# Patient Record
Sex: FEMALE | Race: OTHER | NOT HISPANIC OR LATINO | Employment: FULL TIME | URBAN - METROPOLITAN AREA
[De-identification: names, ages, dates, MRNs, and addresses within clinical notes are randomized per-mention and may not be internally consistent; named-entity substitution may affect disease eponyms.]

---

## 2024-01-09 ENCOUNTER — OFFICE VISIT (OUTPATIENT)
Dept: PRIMARY CARE | Facility: CLINIC | Age: 39
End: 2024-01-09
Payer: COMMERCIAL

## 2024-01-09 VITALS
BODY MASS INDEX: 25.43 KG/M2 | RESPIRATION RATE: 18 BRPM | DIASTOLIC BLOOD PRESSURE: 84 MMHG | WEIGHT: 158.2 LBS | HEART RATE: 109 BPM | TEMPERATURE: 97.5 F | OXYGEN SATURATION: 98 % | HEIGHT: 66 IN | SYSTOLIC BLOOD PRESSURE: 130 MMHG

## 2024-01-09 DIAGNOSIS — J45.40 MODERATE PERSISTENT ASTHMA WITHOUT COMPLICATION (HHS-HCC): Primary | ICD-10-CM

## 2024-01-09 DIAGNOSIS — M79.89 LEG SWELLING: ICD-10-CM

## 2024-01-09 PROCEDURE — 1036F TOBACCO NON-USER: CPT

## 2024-01-09 PROCEDURE — 99213 OFFICE O/P EST LOW 20 MIN: CPT

## 2024-01-09 RX ORDER — ALBUTEROL SULFATE 90 UG/1
2 AEROSOL, METERED RESPIRATORY (INHALATION) EVERY 4 HOURS PRN
Qty: 6.7 G | Refills: 11 | Status: SHIPPED | OUTPATIENT
Start: 2024-01-09 | End: 2025-01-08

## 2024-01-09 RX ORDER — BUDESONIDE AND FORMOTEROL FUMARATE DIHYDRATE 80; 4.5 UG/1; UG/1
2 AEROSOL RESPIRATORY (INHALATION)
Qty: 10.2 G | Refills: 0 | Status: SHIPPED | OUTPATIENT
Start: 2024-01-09

## 2024-01-09 ASSESSMENT — ENCOUNTER SYMPTOMS
EYE ITCHING: 0
ABDOMINAL PAIN: 0
DIZZINESS: 0
UNEXPECTED WEIGHT CHANGE: 0
CONFUSION: 0
FEVER: 0
DIARRHEA: 0
LIGHT-HEADEDNESS: 0
DYSURIA: 0
FREQUENCY: 0
SLEEP DISTURBANCE: 0
RHINORRHEA: 0
HEMATURIA: 0
EYE DISCHARGE: 0
NAUSEA: 0
APPETITE CHANGE: 0
VOMITING: 0
SHORTNESS OF BREATH: 1
WHEEZING: 1
HEADACHES: 0
COUGH: 0
JOINT SWELLING: 0
MYALGIAS: 0
WOUND: 0
CHILLS: 0
CHEST TIGHTNESS: 0
PALPITATIONS: 0
CONSTIPATION: 0

## 2024-01-09 ASSESSMENT — PAIN SCALES - GENERAL: PAINLEVEL: 0-NO PAIN

## 2024-01-09 NOTE — PATIENT INSTRUCTIONS
It was so great to see you today Linda Harris  . Today we discussed:    -Ordered Symbicort to take 2 puffs, twice daily  -refilled albuterol inhaler as needed for shortness of breath or wheezing  -schedule pulmonary function test to check severity of lungs  -Follow up with Dr. Ivy Gonzalez or Fred Harmon  -Ordered compression socks for swelling. Raise leg    Call [phone]  For Care if you need to schedule appointment with specialist or images.    Follow up in       You were see by:    Dr. Melina Simmons D.O.  [facility]   Phone: [phone]

## 2024-01-09 NOTE — LETTER
January 9, 2024     Patient: Linda Harris   YOB: 1985   Date of Visit: 1/9/2024       To Whom It May Concern:    Linda Harris was seen in my clinic on 1/9/2024 at 10:00 am. Please excuse Linda for her absence from work on this day to make the appointment.    If you have any questions or concerns, please don't hesitate to call.         Sincerely,         Melina Simmons,         CC: No Recipients

## 2024-01-09 NOTE — PROGRESS NOTES
"Subjective   Patient ID: Linda Harris is a 39 y.o. female who presents for Establish Care (npv), Med Refill, and Leg Swelling (3-4 months leg swelling. Sometimes it goes downs then swells on and off).    HPI   Patient speaks Upper sorbian and utilized [employer]  [id number]. Patient presents to clinic with the follow complaints.     #Asthma   -complains of shortness of breath with exertion and no symptoms at reset. Reports she hears breathing noises from \"neck\" and points to her throat.  Associated symptoms include coughing at night. States she only has albuterol inhaler at home and has been using it daily. Denies rhinorrhea, fever, chills, or orthopnea. No PFTs on file.      #Leg swelling  - started in the last 3-4 months. Swelling is intermittent and mostly in  BL ankles. Reports prolonged sitting with current job. Denies calf tenderness, pain with ambulation, numbness, or tingling.       PMH: moderate persistent asthma  PSH:  x3, tubal ligation  Allergies: NKDA  SH: denies tobacco use      Review of Systems   Constitutional:  Negative for appetite change, chills, fever and unexpected weight change.   HENT:  Negative for congestion, ear discharge, rhinorrhea and sneezing.    Eyes:  Negative for discharge, itching and visual disturbance.   Respiratory:  Positive for shortness of breath and wheezing. Negative for cough and chest tightness.    Cardiovascular:  Negative for chest pain, palpitations and leg swelling.   Gastrointestinal:  Negative for abdominal pain, constipation, diarrhea, nausea and vomiting.   Endocrine: Negative for cold intolerance and heat intolerance.   Genitourinary:  Negative for dysuria, frequency, hematuria and urgency.   Musculoskeletal:  Negative for joint swelling and myalgias.   Skin:  Negative for rash and wound.   Neurological:  Negative for dizziness, light-headedness and headaches.   Psychiatric/Behavioral:  Negative for confusion, sleep disturbance and suicidal ideas.  " "      Objective   /84 (BP Location: Left arm, Patient Position: Sitting, BP Cuff Size: Adult)   Pulse 109   Temp 36.4 °C (97.5 °F) (Temporal)   Resp 18   Ht 1.676 m (5' 6\")   Wt 71.8 kg (158 lb 3.2 oz)   SpO2 98%   BMI 25.53 kg/m²     Physical Exam  Constitutional:       General: She is not in acute distress.     Appearance: Normal appearance. She is obese. She is not ill-appearing.   HENT:      Head: Normocephalic and atraumatic.      Nose: Nose normal.   Eyes:      Conjunctiva/sclera: Conjunctivae normal.   Cardiovascular:      Rate and Rhythm: Regular rhythm. Tachycardia present.      Heart sounds: No murmur heard.  Pulmonary:      Effort: Pulmonary effort is normal. No respiratory distress.      Breath sounds: Wheezing (mild inspiratory and expiratory wheezing) present. No rhonchi.   Abdominal:      General: There is no distension.      Palpations: Abdomen is soft.      Tenderness: There is no abdominal tenderness. There is no guarding.      Hernia: No hernia is present.      Comments: Obese abdomen   Musculoskeletal:      Right lower leg: No edema.      Left lower leg: No edema.   Skin:     General: Skin is warm and dry.      Findings: No erythema.   Neurological:      General: No focal deficit present.      Mental Status: She is alert.   Psychiatric:         Mood and Affect: Mood normal.         Behavior: Behavior normal.         Assessment/Plan     Linda Harris is a 38 YO female presenting to re-establish care with the  clinic since it has been 3 years since she was last evaluated. Main concern is increased WOB with exertion and wheezing. Denies triggers at this time. Per chart review she was started on Flovent 100mcg 2 puffs BID for uncontrolled asthma symptoms. She reports today via  that she only has albuterol inhaler for which she uses daily. Patient denies tobacco use but from previous not suggested partner is a smoker, however this was not address during the time of visit. " Patient was ageeable to starting Symbicort daily, albuterol PRN, and to schedule PFTs. Patient to follow up in 1 month and recommended she see the two providers in  clinic that speak Korean for optimal patient care without language barrier.   Diagnoses and all orders for this visit:    Moderate persistent asthma without complication  -     albuterol (Proventil HFA) 90 mcg/actuation inhaler; Inhale 2 puffs every 4 hours if needed for wheezing or shortness of breath.  -     Complete Pulmonary Function Test Pre/Post Bronchodialator (Spirometry Pre/Post/DLCO/Lung Volumes); Future  -     budesonide-formoteroL (Symbicort) 80-4.5 mcg/actuation inhaler; Inhale 2 puffs 2 times a day. Rinse mouth with water after use to reduce aftertaste and incidence of candidiasis. Do not swallow.  Leg swelling  -     compression socks, large misc; 2 each once daily for 2 days.  -    Discussed pedal pumping, elevation of legs above heart, and regular walking daily every 90 minutes.  -  Consider an Echo of symptoms persist.     RTC in 1 month to follow up on asthma symptoms and due for  labs    Discussed with Dr. Zaid Simmons, DO  PGY2  Moderate persistent

## 2024-01-10 NOTE — PROGRESS NOTES
I reviewed the resident/fellow's documentation and discussed the patient with the resident/fellow. I agree with the resident/fellow's medical decision making as documented in the note.   Will need controller medication and PFTs as sx concerning for uncontrolled asthma.  No evidence of LE edema at this visit.  Could consider Echo if persistent edema in spite of compression stockings and periodic activation of muscle pumps.    Vee Mar MD